# Patient Record
Sex: FEMALE | NOT HISPANIC OR LATINO | Employment: FULL TIME | URBAN - METROPOLITAN AREA
[De-identification: names, ages, dates, MRNs, and addresses within clinical notes are randomized per-mention and may not be internally consistent; named-entity substitution may affect disease eponyms.]

---

## 2022-06-05 PROCEDURE — U0004 COV-19 TEST NON-CDC HGH THRU: HCPCS | Performed by: PHYSICIAN ASSISTANT

## 2024-10-29 NOTE — PROGRESS NOTES
Subjective   Chief Complaint   Patient presents with    Gynecologic Exam     NGYN- Annual and questions about conceiving.     Brett Zapien is a 29 y.o. year old  presenting to be seen for her annual exam. She states she has irregular periods. She reports skipping her cycle this month, and will do so somewhat intermittently (patient not an accurate historian as she has not kept track). She is unsure if she normally has a cycle every month. She reports menarche age 15, and reports regular cycles up until 5 years ago. She had a string of really bad UTIs and felt her cycles change then. Endorses some clots the size of a jeremias. Her cycles are not particularly painful if she has OTC medications (Motrin). Denies any other medical problems. She has never been on hormonal birth control before. Denies cystic acne, hirsutism or galactorrhea. She has been doing intermittent fasting, but otherwise has had no changes in health history.     SEXUAL Hx:  She is currently sexually active.  In the past year there there has been NO new sexual partners.    Condoms are never used.  She would like to be screened for STD's at today's exam.  Current birth control method: not using any form of contraception.  She is not trying to conceive but would be OK if she did get pregnant. She is not currently taking a PNV.   She is happy with her current method of contraception and does not want to discuss alternative methods of contraception.  MENSTRUAL Hx:  No LMP recorded (lmp unknown).  Intermenstrual bleeding is absent.    Post-coital bleeding is present - intermittently .  Dysmenorrhea: mild  PMS: none  Her cycles ARE a source of concern for her that she wishes to discuss today.  HEALTH Hx:  She exercises regularly: yes.  She wears her seat belt: yes.  She has concerns about domestic violence: no.      The following portions of the patient's history were reviewed and updated as appropriate:problem list, current medications, allergies,  "past family history, past medical history, past social history, and past surgical history.    Social History    Tobacco Use      Smoking status: Never      Smokeless tobacco: Never         Objective   /74 (BP Location: Right arm, Patient Position: Sitting, Cuff Size: Adult)   Ht 162.6 cm (64\")   Wt 64.2 kg (141 lb 9.6 oz)   LMP  (LMP Unknown)   Breastfeeding No   BMI 24.31 kg/m²     General:  well developed; well nourished  no acute distress  mentation appropriate   Skin:  No suspicious lesions seen   Thyroid: not examined   Breasts:  Examined in supine position  Symmetric without masses or skin dimpling  Nipples normal without inversion, lesions or discharge  There are no palpable axillary nodes   Pelvis: Clinical staff was present for exam  External genitalia:  normal appearance of the external genitalia including Bartholin's and [place]'s glands.  :  urethral meatus normal;  Vaginal:  normal pink mucosa without prolapse or lesions.  Cervix:  normal appearance.  Uterus:  normal size, shape and consistency.  Adnexa:  normal bimanual exam of the adnexa.  Rectal:  digital rectal exam not performed; anus visually normal appearing.        Assessment   Annual GYN exam   STD screening   Irregular menses   Fertility counseling      Plan   Pap with STD screening was done today.  If she does not receive the results of the Pap within 2 weeks  time, she was instructed to call to find out the results.  I explained to Brett that the recommendations for Pap smear interval in a low risk patient's has lengthened to 3 years time.  I encouraged her to be seen yearly for a full physical exam including breast and pelvic exam even during the off years when PAP's will not be performed.   Blood work ordered for HIV, Hep B and C, and RPR.   UPT negative in clinic today.  Discussed evaluation for irregular menses, and possible oligomenorrhea. Encouraged patient to track her cycles either on an cj or in her phone. Recommend TVUS " and lab work including: PRL, TSH, Hgb A1c, and Testosterone to evaluate for any underlying endocrine causes of irregular cycles and PCOS.   As patient is not using birth control, recommend taking a daily PNV with at least 400mcg of folic acid and use at home ovulation predictor kits.   The importance of keeping all planned follow-up and taking all medications as prescribed was emphasized.  Follow up for lab review and TVUS or sooner PRN     No orders of the defined types were placed in this encounter.         This note was electronically signed.    Angelita Gregory MD   October 31, 2024

## 2024-10-30 ENCOUNTER — OFFICE VISIT (OUTPATIENT)
Dept: OBSTETRICS AND GYNECOLOGY | Facility: CLINIC | Age: 29
End: 2024-10-30
Payer: COMMERCIAL

## 2024-10-30 VITALS
BODY MASS INDEX: 24.17 KG/M2 | WEIGHT: 141.6 LBS | SYSTOLIC BLOOD PRESSURE: 102 MMHG | HEIGHT: 64 IN | DIASTOLIC BLOOD PRESSURE: 74 MMHG

## 2024-10-30 DIAGNOSIS — Z01.419 ENCOUNTER FOR WELL WOMAN EXAM WITH ROUTINE GYNECOLOGICAL EXAM: Primary | ICD-10-CM

## 2024-10-30 DIAGNOSIS — N92.6 MISSED PERIOD: ICD-10-CM

## 2024-10-30 DIAGNOSIS — N92.6 IRREGULAR MENSTRUAL CYCLE: ICD-10-CM

## 2024-10-30 DIAGNOSIS — Z11.3 SCREENING EXAMINATION FOR STD (SEXUALLY TRANSMITTED DISEASE): ICD-10-CM

## 2024-10-30 DIAGNOSIS — Z31.69 PRE-CONCEPTION COUNSELING: ICD-10-CM

## 2024-10-30 LAB
B-HCG UR QL: NEGATIVE
EXPIRATION DATE: NORMAL
INTERNAL NEGATIVE CONTROL: NEGATIVE
INTERNAL POSITIVE CONTROL: POSITIVE
Lab: NORMAL

## 2024-10-31 ENCOUNTER — PATIENT ROUNDING (BHMG ONLY) (OUTPATIENT)
Dept: OBSTETRICS AND GYNECOLOGY | Facility: CLINIC | Age: 29
End: 2024-10-31
Payer: COMMERCIAL

## 2024-10-31 NOTE — PROGRESS NOTES
My name is Jacqueline, clinical manager    I am with [facility]  [facility]  [address]  [facility] [address] [phone]    I want to officially welcome you to our practice and ask about your recent visit.    Tell me about your visit with us.  What things went well?    We're always looking for ways to make our patients' experiences even better. Do you have recommendations on way we may improve?    Overall were you satisfied with your first visit to our practice?    I appreciate you taking the time to answer a few questions today. Is there anything else I can do for you?    Thank you, and have a great day.

## 2024-11-04 LAB — REF LAB TEST METHOD: NORMAL

## 2024-11-18 ENCOUNTER — LAB (OUTPATIENT)
Dept: LAB | Facility: HOSPITAL | Age: 29
End: 2024-11-18
Payer: COMMERCIAL

## 2024-11-18 DIAGNOSIS — N92.6 IRREGULAR MENSTRUAL CYCLE: ICD-10-CM

## 2024-11-18 DIAGNOSIS — Z11.3 SCREENING EXAMINATION FOR STD (SEXUALLY TRANSMITTED DISEASE): ICD-10-CM

## 2024-11-18 LAB
HBV SURFACE AG SERPL QL IA: NORMAL
HCV AB SER QL: NORMAL
HIV 1+2 AB+HIV1 P24 AG SERPL QL IA: NORMAL
PROLACTIN SERPL-MCNC: 17.1 NG/ML (ref 4.79–23.3)
RPR SER QL: NORMAL
TESTOST SERPL-MCNC: 39.5 NG/DL (ref 8.4–48.1)
TSH SERPL DL<=0.05 MIU/L-ACNC: 0.51 UIU/ML (ref 0.27–4.2)

## 2024-11-18 PROCEDURE — 84403 ASSAY OF TOTAL TESTOSTERONE: CPT

## 2024-11-18 PROCEDURE — G0432 EIA HIV-1/HIV-2 SCREEN: HCPCS

## 2024-11-18 PROCEDURE — 86592 SYPHILIS TEST NON-TREP QUAL: CPT

## 2024-11-18 PROCEDURE — 83036 HEMOGLOBIN GLYCOSYLATED A1C: CPT

## 2024-11-18 PROCEDURE — 87340 HEPATITIS B SURFACE AG IA: CPT

## 2024-11-18 PROCEDURE — 84443 ASSAY THYROID STIM HORMONE: CPT

## 2024-11-18 PROCEDURE — 84146 ASSAY OF PROLACTIN: CPT

## 2024-11-18 PROCEDURE — 86803 HEPATITIS C AB TEST: CPT

## 2024-11-19 LAB — HBA1C MFR BLD: 4.6 % (ref 4.8–5.6)

## 2024-12-02 ENCOUNTER — TELEPHONE (OUTPATIENT)
Dept: OBSTETRICS AND GYNECOLOGY | Facility: CLINIC | Age: 29
End: 2024-12-02

## 2024-12-02 NOTE — TELEPHONE ENCOUNTER
Caller: Brett Zapien    Relationship:  Self    Best call back number: [phone] (home)       PATIENT CALLED REQUESTING TO CANCEL SAME DAY APPT.    Did the patient call AFTER the start time of their scheduled appointment?  []YES  [x]NO    Was the patient's appointment rescheduled? [x]YES  []NO    Any additional information: ILLNESS

## 2025-01-22 NOTE — PROGRESS NOTES
"Subjective   Chief Complaint   Patient presents with    Follow-up     Sono today.     Brett Zapien is a 29 y.o. year old .  Patient's last menstrual period was 2025 (exact date).  She presents for TVUS and fertility follow up due to irregular menses. She reports having a period in November, but is unsure if she had one in December, as she does not track her cycles. She started her January cycle today. She is currently interested in additional infertility work up, but is not yet ready to try to conceive. Her partner has not yet had a semen analysis, and she much prefers a more holistic route of medical care, if possible, particularly with ovulation induction.     The following portions of the patient's history were reviewed and updated as appropriate:current medications, allergies, and past medical history    Social History    Tobacco Use      Smoking status: Never      Smokeless tobacco: Never         Objective   BP 90/60 (BP Location: Left arm, Patient Position: Sitting, Cuff Size: Adult)   Ht 162.6 cm (64.02\")   Wt 63.5 kg (140 lb)   LMP 2025 (Exact Date)   Breastfeeding No   BMI 24.02 kg/m²     General:  well developed; well nourished  no acute distress  mentation appropriate   Lungs:  breathing is unlabored   Heart:  Not performed.     Lab Review   Normal TSH, CBC, PRL, and A1c    Imaging   Pelvic ultrasound report        Assessment   Irregular menses   Infertility evaluation  Adnexal mass   Need for healthcare maintenance      Plan   Reviewed overall normal lab work and need for ovulation evaluation with day 21 progesterone. Order placed for 25. Will call patient with results. Discussed if patient ovulating normally, a more thorough evaluation with [facility] with HSG and semen analysis would be recommended.   Encouraged patient to use ovulation predictor kits on cycle days 10-17 at home, as well, and to continue PNV.  Patient given Dr. Tellez's information with [facility] today for semen " analysis. Patient will reach out via [employer] if partner decides to move forward with procedure.   Patient inquired about natural ways to help ovulate. Discussed currently there are not OTC supplements, etc., specifically for ovulation induction. If PCOS is ever diagnosed, discussed possibility of katerin-inositol to help regulate periods or help with insulin resistance.   TVUS with 2cm possible dermoid. Recommend repeat TVUS in 6 weeks for follow up.   Patient does not have a PCP, and requests referral today.   The importance of keeping all planned follow-up and taking all medications as prescribed was emphasized.  Follow up annual exam or sooner PRN     No orders of the defined types were placed in this encounter.         This note was electronically signed.    Angelita Gregory MD  January 23, 2025

## 2025-01-23 ENCOUNTER — OFFICE VISIT (OUTPATIENT)
Dept: OBSTETRICS AND GYNECOLOGY | Facility: CLINIC | Age: 30
End: 2025-01-23
Payer: COMMERCIAL

## 2025-01-23 VITALS
HEIGHT: 64 IN | WEIGHT: 140 LBS | BODY MASS INDEX: 23.9 KG/M2 | SYSTOLIC BLOOD PRESSURE: 90 MMHG | DIASTOLIC BLOOD PRESSURE: 60 MMHG

## 2025-01-23 DIAGNOSIS — N94.89 ADNEXAL MASS: ICD-10-CM

## 2025-01-23 DIAGNOSIS — Z00.00 HEALTHCARE MAINTENANCE: ICD-10-CM

## 2025-01-23 DIAGNOSIS — N92.6 IRREGULAR MENSTRUAL CYCLE: Primary | ICD-10-CM

## 2025-02-12 ENCOUNTER — LAB (OUTPATIENT)
Dept: LAB | Facility: HOSPITAL | Age: 30
End: 2025-02-12
Payer: COMMERCIAL

## 2025-02-12 ENCOUNTER — TRANSCRIBE ORDERS (OUTPATIENT)
Dept: LAB | Facility: HOSPITAL | Age: 30
End: 2025-02-12
Payer: COMMERCIAL

## 2025-02-12 DIAGNOSIS — N92.6 IRREGULAR MENSTRUAL CYCLE: ICD-10-CM

## 2025-02-12 LAB — PROGEST SERPL-MCNC: 13.6 NG/ML

## 2025-02-12 PROCEDURE — 84144 ASSAY OF PROGESTERONE: CPT

## 2025-02-12 PROCEDURE — 36415 COLL VENOUS BLD VENIPUNCTURE: CPT

## 2025-02-14 DIAGNOSIS — Z31.9 INFERTILITY MANAGEMENT: Primary | ICD-10-CM
